# Patient Record
Sex: MALE | Race: OTHER | HISPANIC OR LATINO | Employment: UNEMPLOYED | URBAN - METROPOLITAN AREA
[De-identification: names, ages, dates, MRNs, and addresses within clinical notes are randomized per-mention and may not be internally consistent; named-entity substitution may affect disease eponyms.]

---

## 2024-08-12 ENCOUNTER — HOSPITAL ENCOUNTER (EMERGENCY)
Facility: MEDICAL CENTER | Age: 44
End: 2024-08-12
Attending: EMERGENCY MEDICINE

## 2024-08-12 ENCOUNTER — PHARMACY VISIT (OUTPATIENT)
Dept: PHARMACY | Facility: MEDICAL CENTER | Age: 44
End: 2024-08-12
Payer: COMMERCIAL

## 2024-08-12 VITALS
OXYGEN SATURATION: 94 % | SYSTOLIC BLOOD PRESSURE: 157 MMHG | TEMPERATURE: 97.2 F | RESPIRATION RATE: 19 BRPM | WEIGHT: 160 LBS | DIASTOLIC BLOOD PRESSURE: 88 MMHG | HEIGHT: 66 IN | HEART RATE: 61 BPM | BODY MASS INDEX: 25.71 KG/M2

## 2024-08-12 DIAGNOSIS — T78.40XA ALLERGIC REACTION, INITIAL ENCOUNTER: ICD-10-CM

## 2024-08-12 PROCEDURE — RXOTC WILLOW AMBULATORY OTC CHARGE

## 2024-08-12 PROCEDURE — 99284 EMERGENCY DEPT VISIT MOD MDM: CPT

## 2024-08-12 PROCEDURE — A9270 NON-COVERED ITEM OR SERVICE: HCPCS | Performed by: EMERGENCY MEDICINE

## 2024-08-12 PROCEDURE — RXMED WILLOW AMBULATORY MEDICATION CHARGE: Performed by: EMERGENCY MEDICINE

## 2024-08-12 PROCEDURE — 96374 THER/PROPH/DIAG INJ IV PUSH: CPT

## 2024-08-12 PROCEDURE — 700111 HCHG RX REV CODE 636 W/ 250 OVERRIDE (IP): Performed by: EMERGENCY MEDICINE

## 2024-08-12 PROCEDURE — 96375 TX/PRO/DX INJ NEW DRUG ADDON: CPT

## 2024-08-12 RX ORDER — PREDNISONE 20 MG/1
20 TABLET ORAL DAILY
Qty: 3 TABLET | Refills: 0 | Status: SHIPPED | OUTPATIENT
Start: 2024-08-12 | End: 2024-08-15

## 2024-08-12 RX ORDER — EPINEPHRINE 0.3 MG/.3ML
INJECTION SUBCUTANEOUS
Qty: 2 EACH | Refills: 0 | Status: SHIPPED | OUTPATIENT
Start: 2024-08-12

## 2024-08-12 RX ORDER — METHYLPREDNISOLONE SODIUM SUCCINATE 125 MG/2ML
125 INJECTION, POWDER, LYOPHILIZED, FOR SOLUTION INTRAMUSCULAR; INTRAVENOUS ONCE
Status: COMPLETED | OUTPATIENT
Start: 2024-08-12 | End: 2024-08-12

## 2024-08-12 RX ORDER — CETIRIZINE HYDROCHLORIDE 1 MG/ML
10 SOLUTION ORAL ONCE
Status: COMPLETED | OUTPATIENT
Start: 2024-08-12 | End: 2024-08-12

## 2024-08-12 RX ADMIN — FAMOTIDINE 20 MG: 10 INJECTION, SOLUTION INTRAVENOUS at 02:59

## 2024-08-12 RX ADMIN — METHYLPREDNISOLONE SODIUM SUCCINATE 125 MG: 125 INJECTION, POWDER, FOR SOLUTION INTRAMUSCULAR; INTRAVENOUS at 03:00

## 2024-08-12 RX ADMIN — CETIRIZINE HYDROCHLORIDE 10 MG: 1 SOLUTION ORAL at 02:58

## 2024-08-12 NOTE — ED TRIAGE NOTES
"Chief Complaint   Patient presents with    Allergic Reaction     Pt BIBA from [facility]. 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 Pt has since had improvement of these symptoms. Additionally given 1L LR and 1mg Versed by EMS. Pt unaware of any potential allergens he came in contact with, reports a miki brushy work environment. Pt has no Hx of similar allergic reactions       "

## 2024-12-10 NOTE — ED PROVIDER NOTES
"ED Provider Note    CHIEF COMPLAINT  Chief Complaint   Patient presents with    Allergic Reaction     Pt BIBA from [facility]. 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 Pt has since had improvement of these symptoms. Additionally given 1L LR and 1mg Versed by EMS. Pt unaware of any potential allergens he came in contact with, reports a miki brushy work environment. Pt has no Hx of similar allergic reactions       EXTERNAL RECORDS REVIEWED  Other none available    HPI/ROS  LIMITATION TO HISTORY   Select: : None  OUTSIDE HISTORIAN(S):  None    Gerhard Argueta is a 44 y.o. male who presents to the emergency department after allergic reaction.  Past medical history is largely benign.  Believe that he may have some slight plantation as environmental allergies historically but no known direct plants.  Primary lives in [place].  Has been here in [facility] for at least 1 week as he is currently working at setting up at [employer].  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  After this he started to have difficulty breathing and swollen lips and irritated eyes.  He was provided with an EpiPen as well as 50 mg of Benadryl and ultimately 2 L of saline from the time of his initial medical contact until now.  Furthermore he was provided with a single dose of Versed as EMS was reason going to transport the patient by flight transportation however ultimately he was able to travel by ground.  At this time he is feeling better but does believe that he was significant better after epinephrine and now he feels like the eye irritation and nasal congestion is returning.  Denies any significant difficulty with breathing or swallowing at this point.  Does feel like he has a dry mouth but feels that he has been dehydrated [place]  Denies any nausea or vomiting.  No abdominal pain.  No rash.  No itching.    No new or atypical notable drinks or food yesterday    PAST MEDICAL HISTORY       SURGICAL HISTORY  patient denies any surgical history    FAMILY HISTORY  No family history on file.    SOCIAL HISTORY  Social History     Tobacco Use    Smoking status: Not on file    Smokeless tobacco: Not on file   Substance and Sexual Activity    Alcohol use: Not on file    Drug use: Not on file    Sexual activity: Not on file       CURRENT MEDICATIONS  Home Medications    **Home medications have not yet been reviewed for this encounter**         ALLERGIES  No Known Allergies    PHYSICAL EXAM  VITAL SIGNS: BP (!) 157/88   Pulse 61   Temp 36.2 °C (97.2 °F) (Oral)   Resp 19   Ht 1.676 m (5' 6\")   Wt 72.6 kg (160 lb)   SpO2 94%   BMI 25.82 kg/m²        Pulse ox interpretation: I interpret this pulse ox as normal.  Constitutional: Alert in no apparent distress.  HENT: No signs of trauma, Bilateral external ears normal, Nose normal.   Eyes: Pupils are equal and reactive, Conjunctiva is injected bilaterally no discharge.  Lips appear slightly swollen.  Oropharynx is otherwise clear.  No significant tongue or uvular deviation or edema.  Tolerating secretions.  Good phonation of voice.    Neck: Normal range of motion, No tenderness, Supple, No stridor.   Cardiovascular: Regular rate and rhythm, no murmurs.   Thorax & Lungs: Normal breath sounds, No respiratory distress, No wheezing, No chest tenderness.   Abdomen: Bowel sounds normal, Soft, No tenderness  Skin: Warm, Dry, No erythema, No rash.   Musculoskeletal: Good range of motion in all major joints. No tenderness to palpation or major deformities noted.   Neurologic: Alert , Normal motor function, Normal sensory function, No focal deficits noted.   Psychiatric: Affect normal, Judgment normal, Mood normal.         COURSE & MEDICAL DECISION " MAKING    ASSESSMENT, COURSE AND PLAN  Care Narrative: 44-year-old male presenting to the emergency department with above presentation.  Treated as anaphylaxis prior to arrival.  I will continue care with Pepcid and steroids at this time and observe further.    DISPOSITION AND DISCUSSIONS  I have discussed management of the patient with the following physicians and ZAKIA's: None    Discussion of management with other QHP or appropriate source(s): None     Escalation of care considered, and ultimately not performed:Laboratory analysis, diagnostic imaging, and acute inpatient care management, however at this time, the patient is most appropriate for outpatient management    Barriers to care at this time, including but not limited to: Patient does not have established PCP.     Decision tools and prescription drugs considered including, but not limited to: Medication modification patient provided with prescription for EpiPen and a few days of additional steroid .  Patient also understanding of additional ongoing outpatient use of Benadryl and Pepcid as needed    44-year-old male presenting with above presentation.  Patient likely having had anaphylactic reaction to unknown exposure however given history possible vegetation induced.  At this point will not require further ER workup or inpatient care.  Please see medications as provided above.  Patient will return here to the ER with any change or worsening of symptoms.        FINAL DIAGNOSIS  1. Allergic reaction, initial encounter         Electronically signed by: López Talamantes M.D., 8/12/2024 2:49 AM